# Patient Record
Sex: FEMALE | Race: WHITE
[De-identification: names, ages, dates, MRNs, and addresses within clinical notes are randomized per-mention and may not be internally consistent; named-entity substitution may affect disease eponyms.]

---

## 2019-08-08 ENCOUNTER — HOSPITAL ENCOUNTER (OUTPATIENT)
Dept: HOSPITAL 96 - M.PC | Age: 64
Discharge: HOME | End: 2019-08-08
Attending: ANESTHESIOLOGY
Payer: MEDICARE

## 2019-08-08 DIAGNOSIS — Z90.710: ICD-10-CM

## 2019-08-08 DIAGNOSIS — Z98.890: ICD-10-CM

## 2019-08-08 DIAGNOSIS — E78.5: ICD-10-CM

## 2019-08-08 DIAGNOSIS — M79.7: ICD-10-CM

## 2019-08-08 DIAGNOSIS — K21.9: ICD-10-CM

## 2019-08-08 DIAGNOSIS — Z88.8: ICD-10-CM

## 2019-08-08 DIAGNOSIS — M54.16: Primary | ICD-10-CM

## 2019-08-08 DIAGNOSIS — I10: ICD-10-CM

## 2019-08-08 DIAGNOSIS — E78.00: ICD-10-CM

## 2019-08-08 DIAGNOSIS — Z79.899: ICD-10-CM

## 2019-08-08 DIAGNOSIS — F41.9: ICD-10-CM

## 2019-08-08 DIAGNOSIS — F32.9: ICD-10-CM

## 2019-08-08 DIAGNOSIS — G89.29: ICD-10-CM

## 2019-09-05 ENCOUNTER — HOSPITAL ENCOUNTER (OUTPATIENT)
Dept: HOSPITAL 96 - M.PC | Age: 64
Discharge: HOME | End: 2019-09-05
Attending: ANESTHESIOLOGY
Payer: MEDICARE

## 2019-09-05 DIAGNOSIS — M54.16: Primary | ICD-10-CM

## 2019-09-05 DIAGNOSIS — Z79.891: ICD-10-CM

## 2019-09-05 DIAGNOSIS — M79.7: ICD-10-CM

## 2019-09-05 DIAGNOSIS — I10: ICD-10-CM

## 2019-09-05 DIAGNOSIS — E07.9: ICD-10-CM

## 2019-09-05 DIAGNOSIS — E78.5: ICD-10-CM

## 2019-09-05 DIAGNOSIS — K21.9: ICD-10-CM

## 2019-09-05 DIAGNOSIS — Z79.899: ICD-10-CM

## 2019-09-05 DIAGNOSIS — M48.061: ICD-10-CM

## 2019-09-05 DIAGNOSIS — Z98.890: ICD-10-CM

## 2019-09-05 DIAGNOSIS — E78.00: ICD-10-CM

## 2019-09-05 DIAGNOSIS — Z88.8: ICD-10-CM

## 2019-09-09 NOTE — PAINCON
[address]  [address]                    PAIN MANAGEMENT CONSULTATION  
_______________________________________________________________________________
 
Name:       PALLAVI CHEW SOLEDAD               Room:                      REG MADHAV SOUSA.#:  [id number]      Account #:      [id number]  
Admission:  19     Attend Phys:    EDGAR Kelley MD   
Discharge:               Date of Birth:  [birth year]  
         Report #: [id number]
                                                                     [id number]  
_______________________________________________________________________________
THIS REPORT FOR:  //name//                      
 
CC: Aleksey Jones
 
DATE OF SERVICE:  2019
 
 
CHIEF COMPLAINT:  "Pain in my leg is still there."
 
HISTORY:  The patient is a 64-year-old female who has been seen in the Pain
Clinic because of pain with prolonged standing.  The patient notes the pain
radiates down into her leg.  It radiates down into the buttocks on both sides. 
The patient has difficulty cleaning the kitchen.  Standing while cooking is
problematic.  Doing dishes is problematic as well.  Standing and combing her
hair or brushing teeth in the bathroom is problematic and notes increased pain
in the back.  After sitting down for about 2 minutes, she notes an improvement
in her pain.  She has difficulty going out and shopping.  When she does, she
leans forward on the cart.  Prolonged standing in the line can be quite
problematic.  Notes pain builds up in the lower portion of her back down into
the buttocks and radiates down the posterior portion of her legs.  She sleeps in
a recliner.  Does have some pain in her knee.  She feels that she continues to
be flexible.  Continues to do as stretching exercises.  She has Sjogren disease
and as a result causes dry mouth and is having some problems with her gums and
teeth.  She has tried CBD oil.  She has undergone injections in her knees.  She
feels that she may have significantly aggravated her back by taking care of her
 over the last 6 years.  She was pretty much house down.  She weighed 400
pounds.  She unfortunately  in 2019.  The patient at this juncture, feels
like she should concentrate on her own health and is putting a lot of emphasis
there.
 
ALLERGIES:  TYLENOL WITH CODEINE, VANCOMYCIN, FLOXIN.
 
CURRENT MEDICATIONS:  Hydrocodone 7.5 mg one p.o. 4-6 hours, nortriptyline 75
mg, Flexeril 5 mg t.i.d., Xanax XR 0.5 mg for anxiety, Wellbutrin 75 mg, 150 mg
daily, levothyroxine, 0.137 mg, nortriptyline to improve mood, Protonix 20 mg,
reflux, heartburn, Zocor 40 mg, topiramate 100 mg for seizures b.i.d.,
triamterene/hydrochlorothiazide 75/50, Ambien 5 mg.
 
PAIN CLINIC ASSESSMENT/PQRS:
1.  Height 5 feet 2 inches, weight 255 pounds, BMI is 46.6.
2.  Vital Signs:  Blood pressure 128/76, heart rate 85, respiratory rate 16,
room air saturation 98%.
3.  Temperature 98.
4.  Pain intensity, 6/10.
 
 
 
[place] [address]                    PAIN MANAGEMENT CONSULTATION  
_______________________________________________________________________________
 
Name:       PALLAVI CHEW               Room:                      REG MADHAV HAMMONDS#:  [id number]      Account #:      [id number]  
Admission:  19     Attend Phys:    EDGAR Kelley MD   
Discharge:               Date of Birth:  [birth year]  
         Report #: [id number]
                                                                     [id number]  
_______________________________________________________________________________
5.  Fall history:  The patient has not fallen in the last 3 months.
6.  Blood thinner.  The patient is not on a blood thinning medication.
7.  Hypertension.  The patient is being treated for hypertension.
8.  Opioids greater than 6 weeks.  The patient receives medication from one
source.
9.  Risk assessment tool, low for opioid use.
10.  Functional assessment tool.
11.  Recreational drug use.  The patient denies.
12.  Tobacco:  The patient denies use of tobacco.
13.  Alcohol.  The patient denies use of alcoholic beverages.
 
PHYSICAL EXAMINATION:
GENERAL:  The patient is a well-developed, well-nourished white female.  She is
obese.  Appears her stated age.  She is alert and oriented x 3.  Her affect is
appropriate.  Speech is fluent.
HEENT:  Normocephalic, atraumatic.  Extraocular eye muscles intact.  Sclerae
nonicteric.  Mucous membranes are dry and the patient is drinking or sipping
water during the interview.  Some lower teeth are missing.
NECK:  Without adenopathy or JVD.
HEART:  Regular rate.
ABDOMEN:  Protuberant.
MUSCULOSKELETAL:  The patient with muscle strength of 5/5 for the major muscle
groups in the upper extremity.  Deep tendon reflexes are trace bilaterally at
the biceps.  The patient has some discomfort in the lower portion of her back. 
Complains of pain and discomfort, which continues to build after prolonged
standing with radiation down to the buttocks areas bilaterally.  The patient
notes improvement in the pain and discomfort after sitting for about 2-3
minutes.
 
IMPRESSION:
1.  Lumbar radiculopathy, L4-L5 dermatomal distribution with history of spinal
stenosis.
2.  Multiple autoimmune conditions, which include lupus, Sjogren's,
fibromyalgia, thyroid, possible rheumatoid arthritis.
3.  Hypertension.
4.  Gastroesophageal reflux disease.
5.  Elevated lipids.
6.  Elevated cholesterol.
7.  Emotional problems.
8.  Stomach problems.
 
RECOMMENDATIONS:  We discussed treatment options with the patient.  Risks and
benefits of an epidural steroid injection were again reviewed.  They include but
are not limited to infection, worsening pain, no improvement in pain and the
patient elects to proceed.
 
 
 
 
[place] [address]                    PAIN MANAGEMENT CONSULTATION  
_______________________________________________________________________________
 
Name:       PALLAVI CHEW SOLEDAD               Room:                      REG CLI 
M.R.#:  [id number]      Account #:      [id number]  
Admission:  19     Attend Phys:    EDGAR Kelley MD   
Discharge:               Date of Birth:  [birth year]  
         Report #: [id number]
                                                                     [id number]  
_______________________________________________________________________________
PROCEDURE NOTE:  The patient was taken to the procedure area.  She was then
assisted in getting on the examination table.  Her back was sterilely prepped
with a Betadine solution.  A 0.25% bupivacaine was infiltrated at the L4-L5
interspace.  A midline approach was undertaken.  A 17-gauge Tuohy with loss of
resistance technique was used to gain access to the epidural space.  There was
no CSF, heme or paresthesia.  Total of 80 mg Depo-Medrol, 40 mg triamcinolone
and 2 mL of 0.25% bupivacaine was injected.  The patient tolerated the procedure
well.  A script for physical therapy has been provided.  The patient will
undergo physical therapy with hopes of improving her pain and receiving more
information regarding appropriate exercises for spinal stenosis and lumbar
radiculopathy.
 
 
 
 
 
 
 
 
 
 
 
 
 
 
 
 
 
 
 
 
 
 
 
 
 
 
 
 
 
 
 
 
 
<ELECTRONICALLY SIGNED>
                                        By:  EDGAR Kelley MD            
19     1125
D: 19 1256_______________________________________
T: 19 1449N. Lizandro Kelley MD               /nt

## 2019-11-27 ENCOUNTER — HOSPITAL ENCOUNTER (OUTPATIENT)
Dept: HOSPITAL 96 - M.LAB | Age: 64
End: 2019-11-27
Attending: ANESTHESIOLOGY
Payer: MEDICARE

## 2019-11-27 DIAGNOSIS — E87.6: Primary | ICD-10-CM

## 2019-12-12 ENCOUNTER — HOSPITAL ENCOUNTER (OUTPATIENT)
Dept: HOSPITAL 96 - M.PC | Age: 64
End: 2019-12-12
Attending: ANESTHESIOLOGY
Payer: MEDICARE

## 2019-12-12 DIAGNOSIS — M79.7: ICD-10-CM

## 2019-12-12 DIAGNOSIS — Z79.891: ICD-10-CM

## 2019-12-12 DIAGNOSIS — E78.00: ICD-10-CM

## 2019-12-12 DIAGNOSIS — K21.9: ICD-10-CM

## 2019-12-12 DIAGNOSIS — Z79.899: ICD-10-CM

## 2019-12-12 DIAGNOSIS — M54.16: Primary | ICD-10-CM

## 2019-12-12 DIAGNOSIS — M32.9: ICD-10-CM

## 2019-12-12 DIAGNOSIS — E78.5: ICD-10-CM

## 2019-12-12 DIAGNOSIS — Z98.890: ICD-10-CM

## 2019-12-12 DIAGNOSIS — E07.9: ICD-10-CM

## 2019-12-12 DIAGNOSIS — I10: ICD-10-CM

## 2019-12-12 DIAGNOSIS — Z88.8: ICD-10-CM

## 2019-12-18 NOTE — PAINCON
[address]  [address]                    PAIN MANAGEMENT CONSULTATION  
_______________________________________________________________________________
 
Name:       PALLAVI CHEW SOLEDAD               Room:                      REG CLI 
CASSIUS#:  [id number]      Account #:      [id number]  
Admission:  12/12/19     Attend Phys:    EDGAR Kelley MD   
Discharge:               Date of Birth:  [birth year]  
         Report #: [id number]
                                                                     [id number]  
_______________________________________________________________________________
THIS REPORT FOR:  //name//                      
 
CC: Milton Jones
 
DATE OF SERVICE:  12/12/2019
 
 
CHIEF COMPLAINT:  "Still having pain in the left back and down into the left
hip.
 
HISTORY:  The patient is a 64-year-old female who has been seen in the pain
clinic because of pain in the left leg in the posterior area.  She notes that
her pain continues to be problematic.  She does walk with a rolling walker. 
Pain radiates down into her leg.  It involves her buttocks on both sides.  She
does have some difficulty with activities of daily living because of this pain. 
She has been hospitalized at [facility] for influenza B in 09/2019.  She has
been followed by her pulmonologist.  Scheduled to see them again on 12/30/2019. 
As you may recall, she suffers from Sjogren's disease and fibromyalgia.  There
is some question as to whether or not she has involvement of her lungs.  She
still has some dry eyes.  She is having problems with her lower jaw.  Her teeth
may need to be extracted.  She has returned today for another epidural steroid
injection to help control the pain.  She rates her pain as a 7/10.  She does use
CBD oil to help with her pain.  She has undergone injections in her knees in the
past.  She has spent a lot of time caring for her .
 
ALLERGIES:  TYLENOL WITH CODEINE, VANCOMYCIN, AND FLOXIN.
 
CURRENT MEDICATIONS:  Hydrocodone 7.5 mg one p.o. every 4 - 6 hours,
nortriptyline 75 mg, Flexeril 5 mg t.i.d., Zanaflex XR 0.5 mg for anxiety,
Wellbutrin 150 mg daily, levothyroxine 0.137 mg, nortriptyline to improve mood,
Protonix 20 mg, reflux and heartburn; Zocor 40 mg, Topamax 100 mg for seizures
b.i.d., triamterene/hydrochlorothiazide 75/50 mg and Ambien 5 mg.
 
PAIN CLINIC ASSESSMENT AND PQRS:
1.  Height 5 feet 2 inches, weight 259 pounds, BMI is 47.
2.  Vital Signs:  Blood pressure 141/86, heart rate 88, respiratory rate 18,
room air saturation is 98%.
3.  Pain intensity 7/10.
4.  Fall history:  The patient has not fallen since we saw her last.
5.  Blood thinner.  The patient is not on a blood thinning medication.
6.  Hypertension.  The patient is being treated for hypertension.
7.  Opioids greater than 6 weeks.  The [place] [address]                    PAIN MANAGEMENT CONSULTATION  
_______________________________________________________________________________
 
Name:       CHEWPALLAVI SOLEDAD               Room:                      REG CLI 
CASSIUS#:  [id number]      Account #:      [id number]  
Admission:  12/12/19     Attend Phys:    EDGAR Kelley MD   
Discharge:               Date of Birth:  [birth year]  
         Report #: [id number]
                                                                     [id number]  
_______________________________________________________________________________
source.
8.  Risk assessment tool, low for opioid use.
9.  Functional assessment tool has been reviewed.
10.  Recreational drug use:  The patient denies.
11.  Tobacco:  The patient denies.
12.  Alcohol.  The patient denies significant alcohol intake.
 
PHYSICAL EXAMINATION:
GENERAL:  The patient is a well-developed, well-nourished white female.  Appears
her stated age.  She is alert and oriented x 3.  Her affect is appropriate. 
Speech is fluent.
HEENT:  Normocephalic, atraumatic.  Extraocular eye muscles intact.  The patient
does complain of some dryness in her eyes.  Has a dry mouth.  Notes failure of
her dental hygiene and the lower jaw area because of lack of saliva.  We will
discontinue considering having some teeth extracted.
NECK:  Without adenopathy or JVD.
HEART:  Regular rate.
ABDOMEN:  Protuberant.  Bowel sounds present.
MUSCULOSKELETAL:  Upper extremity muscle strength 5-/5 for the major muscle
groups in the upper extremity.  Lower extremities; the patient has pain and
discomfort in lower portion of her back in the L4-L5 dermatomal distribution. 
Complains of pain that radiates down in the lower portion of her back and
buttocks bilaterally.
 
IMPRESSION:
1.  Lumbar radiculopathy at L4-L5 dermatomal distribution with history of spinal
stenosis.
2.  Multiple autoimmune conditions, which include lupus, Sjogren's,
fibromyalgia, thyroid disease, and questionable rheumatoid arthritis.
3.  Hypertension.
4.  Gastroesophageal reflux disease.
5.  Elevated lipids.
6.  Elevated cholesterol.
7.  Emotional problems.
8.  Stomach problems.
 
RECOMMENDATIONS:  We discussed treatment options with the patient.  Risks and
benefits of an epidural steroid injection were again reviewed.  Possible
complications of the procedure, which could include but are not limited to
infection, worsening pain, no improvement in pain, nerve damage, and spinal
headache were reviewed and the patient elects to proceed.
 
PROCEDURE NOTE:  The patient was taken to the procedure area.  She was then
assisted in getting on examination table.  Her back was sterilely prepped with a
Betadine solution.  A 0.25% bupivacaine was infiltrated at the L4-L5 interspace.
 Fluoroscopy using anterior and posterior view were used to place the injection.
 
 
 
[place] [address]                    PAIN MANAGEMENT CONSULTATION  
_______________________________________________________________________________
 
Name:       PALLAVI CHEW               Room:                      REG CLI 
M..#:  [id number]      Account #:      [id number]  
Admission:  12/12/19     Attend Phys:    EDGAR Kelley MD   
Discharge:               Date of Birth:  [birth year]  
         Report #: [id number]
                                                                     [id number]  
_______________________________________________________________________________
 At the L4-L5 interspace, a 17-gauge Tuohy with loss of resistance technique was
used to gain access to the epidural space.  There was no CSF, heme or
paresthesia.  Total of 80 mg Depo-Medrol, 40 mg triamcinolone, and 2 mL of 0.25%
bupivacaine was injected.  The patient tolerated the procedure well.  There were
no complications.  Her pain decreased to 0 at the time of discharge.  A 26
seconds fluoroscopy time was used.  The patient will follow up in the future as
needed.
 
We would like to thank you for letting us participate in her care.  We hope she
continues to improve.
 
 
 
 
 
 
 
 
 
 
 
 
 
 
 
 
 
 
 
 
 
 
 
 
 
 
 
 
 
 
 
 
 
 
<ELECTRONICALLY SIGNED>
                                        By:  EDGAR Kelley MD            
12/18/19     1330
D: 12/12/19 1403_______________________________________
T: 12/12/19 1459N. Lizandro Kelley MD               /nt

## 2020-11-05 ENCOUNTER — HOSPITAL ENCOUNTER (EMERGENCY)
Dept: HOSPITAL 96 - M.ERS | Age: 65
Discharge: LEFT BEFORE DISCHARGE | End: 2020-11-05
Payer: MEDICARE

## 2020-11-05 VITALS — HEIGHT: 62 IN | BODY MASS INDEX: 47.11 KG/M2 | WEIGHT: 256 LBS

## 2020-11-05 VITALS — DIASTOLIC BLOOD PRESSURE: 89 MMHG | SYSTOLIC BLOOD PRESSURE: 148 MMHG

## 2020-11-05 DIAGNOSIS — Z90.710: ICD-10-CM

## 2020-11-05 DIAGNOSIS — Z88.6: ICD-10-CM
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
## 2020-11-05 DIAGNOSIS — Z79.899: ICD-10-CM
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
## 2020-11-05 DIAGNOSIS — S86.911A: Primary | ICD-10-CM
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
## 2020-11-05 DIAGNOSIS — M79.7: ICD-10-CM

## 2020-11-05 DIAGNOSIS — Z88.1: ICD-10-CM
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
## 2021-11-23 ENCOUNTER — HOSPITAL ENCOUNTER (OUTPATIENT)
Dept: HOSPITAL 96 - M.PC | Age: 66
Discharge: HOME | End: 2021-11-23
Attending: ANESTHESIOLOGY
Payer: MEDICARE

## 2021-11-23 DIAGNOSIS — M54.16: Primary | ICD-10-CM

## 2021-11-23 DIAGNOSIS — Z88.8: ICD-10-CM

## 2021-11-23 DIAGNOSIS — G89.29: ICD-10-CM

## 2021-11-23 DIAGNOSIS — E78.00: ICD-10-CM

## 2021-11-23 DIAGNOSIS — Z90.710: ICD-10-CM

## 2021-11-23 DIAGNOSIS — I10: ICD-10-CM

## 2021-11-23 DIAGNOSIS — K21.9: ICD-10-CM

## 2021-11-23 DIAGNOSIS — M79.7: ICD-10-CM

## 2021-11-23 DIAGNOSIS — Z79.899: ICD-10-CM

## 2021-11-23 DIAGNOSIS — Z88.6: ICD-10-CM

## 2021-11-23 DIAGNOSIS — Z98.890: ICD-10-CM

## 2022-01-21 ENCOUNTER — HOSPITAL ENCOUNTER (OUTPATIENT)
Dept: HOSPITAL 96 - M.LAB | Age: 67
End: 2022-01-21
Attending: NURSE PRACTITIONER
Payer: MEDICARE

## 2022-01-21 DIAGNOSIS — K57.90: Primary | ICD-10-CM

## 2022-01-21 DIAGNOSIS — R10.32: ICD-10-CM

## 2022-01-21 DIAGNOSIS — Z80.0: ICD-10-CM

## 2022-01-21 DIAGNOSIS — R19.4: ICD-10-CM

## 2022-01-21 LAB
ABSOLUTE BASOPHILS: 0.1 THOU/UL (ref 0–0.2)
ABSOLUTE EOSINOPHILS: 0.1 THOU/UL (ref 0–0.7)
ABSOLUTE MONOCYTES: 0.5 THOU/UL (ref 0–1.2)
ALBUMIN SERPL-MCNC: 3.2 G/DL (ref 3.4–5)
ALP SERPL-CCNC: 90 U/L (ref 46–116)
ALT SERPL-CCNC: 23 U/L (ref 30–65)
ANION GAP SERPL CALC-SCNC: 8 MMOL/L (ref 7–16)
AST SERPL-CCNC: 19 U/L (ref 15–37)
BASOPHILS NFR BLD AUTO: 0.9 %
BILIRUB SERPL-MCNC: 0.5 MG/DL
BUN SERPL-MCNC: 12 MG/DL (ref 7–18)
CALCIUM SERPL-MCNC: 8.6 MG/DL (ref 8.5–10.1)
CHLORIDE SERPL-SCNC: 104 MMOL/L (ref 98–107)
CO2 SERPL-SCNC: 29 MMOL/L (ref 21–32)
CREAT SERPL-MCNC: 0.9 MG/DL (ref 0.6–1.3)
EOSINOPHIL NFR BLD: 1.8 %
GLUCOSE SERPL-MCNC: 97 MG/DL (ref 70–99)
GRANULOCYTES NFR BLD MANUAL: 63.4 %
HCT VFR BLD CALC: 41 % (ref 37–47)
HGB BLD-MCNC: 13.6 GM/DL (ref 12–15)
LYMPHOCYTES # BLD: 2 THOU/UL (ref 0.8–5.3)
LYMPHOCYTES NFR BLD AUTO: 27.5 %
MCH RBC QN AUTO: 28.8 PG (ref 26–34)
MCHC RBC AUTO-ENTMCNC: 33.2 G/DL (ref 28–37)
MCV RBC: 86.6 FL (ref 80–100)
MONOCYTES NFR BLD: 6.4 %
MPV: 7.8 FL. (ref 7.2–11.1)
NEUTROPHILS # BLD: 4.6 THOU/UL (ref 1.6–8.1)
NUCLEATED RBCS: 0 /100WBC
PLATELET COUNT*: 256 THOU/UL (ref 150–400)
POTASSIUM SERPL-SCNC: 3.6 MMOL/L (ref 3.5–5.1)
PROT SERPL-MCNC: 6.9 G/DL (ref 6.4–8.2)
RBC # BLD AUTO: 4.74 MIL/UL (ref 4.2–5)
RDW-CV: 15 % (ref 10.5–14.5)
SODIUM SERPL-SCNC: 141 MMOL/L (ref 136–145)
WBC # BLD AUTO: 7.3 THOU/UL (ref 4–11)